# Patient Record
Sex: FEMALE | Race: WHITE | URBAN - METROPOLITAN AREA
[De-identification: names, ages, dates, MRNs, and addresses within clinical notes are randomized per-mention and may not be internally consistent; named-entity substitution may affect disease eponyms.]

---

## 2021-02-10 ENCOUNTER — APPOINTMENT (OUTPATIENT)
Dept: URBAN - METROPOLITAN AREA SURGERY 9 | Age: 76
Setting detail: DERMATOLOGY
End: 2021-02-10

## 2021-02-10 DIAGNOSIS — L57.0 ACTINIC KERATOSIS: ICD-10-CM

## 2021-02-10 DIAGNOSIS — L81.4 OTHER MELANIN HYPERPIGMENTATION: ICD-10-CM

## 2021-02-10 PROCEDURE — 17000 DESTRUCT PREMALG LESION: CPT

## 2021-02-10 PROCEDURE — OTHER LIQUID NITROGEN: OTHER

## 2021-02-10 PROCEDURE — OTHER SUNSCREEN RECOMMENDATIONS: OTHER

## 2021-02-10 PROCEDURE — 99202 OFFICE O/P NEW SF 15 MIN: CPT | Mod: 25

## 2021-02-10 PROCEDURE — 17003 DESTRUCT PREMALG LES 2-14: CPT

## 2021-02-10 PROCEDURE — OTHER REASSURANCE: OTHER

## 2021-02-10 PROCEDURE — OTHER COUNSELING: OTHER

## 2021-02-10 ASSESSMENT — LOCATION DETAILED DESCRIPTION DERM
LOCATION DETAILED: LEFT ULNAR DORSAL HAND
LOCATION DETAILED: RIGHT ULNAR DORSAL HAND
LOCATION DETAILED: LEFT SUPERIOR ANTERIOR NECK
LOCATION DETAILED: RIGHT RADIAL DORSAL HAND

## 2021-02-10 ASSESSMENT — LOCATION ZONE DERM
LOCATION ZONE: HAND
LOCATION ZONE: NECK

## 2021-02-10 ASSESSMENT — LOCATION SIMPLE DESCRIPTION DERM
LOCATION SIMPLE: RIGHT HAND
LOCATION SIMPLE: LEFT HAND
LOCATION SIMPLE: LEFT ANTERIOR NECK

## 2021-02-10 NOTE — PROCEDURE: LIQUID NITROGEN
Duration Of Freeze Thaw-Cycle (Seconds): 5
Total Number Of Aks Treated: 14
Detail Level: Zone
Consent: The patient's consent was obtained including but not limited to risks of crusting, blistering, scarring, pigmentary change.
Render Post-Care Instructions In Note?: no
Number Of Freeze-Thaw Cycles: 1 freeze-thaw cycle
Post-Care Instructions: Pt may apply Vaseline to crusted or scabbing areas.